# Patient Record
Sex: FEMALE
[De-identification: names, ages, dates, MRNs, and addresses within clinical notes are randomized per-mention and may not be internally consistent; named-entity substitution may affect disease eponyms.]

---

## 2023-11-24 ENCOUNTER — NON-APPOINTMENT (OUTPATIENT)
Age: 53
End: 2023-11-24

## 2024-01-28 ENCOUNTER — NON-APPOINTMENT (OUTPATIENT)
Age: 54
End: 2024-01-28

## 2024-01-30 PROBLEM — Z00.00 ENCOUNTER FOR PREVENTIVE HEALTH EXAMINATION: Status: ACTIVE | Noted: 2024-01-30

## 2024-01-31 ENCOUNTER — APPOINTMENT (OUTPATIENT)
Age: 54
End: 2024-01-31
Payer: COMMERCIAL

## 2024-01-31 VITALS
SYSTOLIC BLOOD PRESSURE: 133 MMHG | DIASTOLIC BLOOD PRESSURE: 83 MMHG | OXYGEN SATURATION: 96 % | WEIGHT: 182 LBS | HEART RATE: 70 BPM | BODY MASS INDEX: 31.07 KG/M2 | HEIGHT: 64 IN

## 2024-01-31 DIAGNOSIS — M25.561 PAIN IN RIGHT KNEE: ICD-10-CM

## 2024-01-31 DIAGNOSIS — S83.104A UNSPECIFIED DISLOCATION OF RIGHT KNEE, INITIAL ENCOUNTER: ICD-10-CM

## 2024-01-31 DIAGNOSIS — S83.411A SPRAIN OF MEDIAL COLLATERAL LIGAMENT OF RIGHT KNEE, INITIAL ENCOUNTER: ICD-10-CM

## 2024-01-31 PROCEDURE — 20610 DRAIN/INJ JOINT/BURSA W/O US: CPT | Mod: RT

## 2024-01-31 PROCEDURE — 99204 OFFICE O/P NEW MOD 45 MIN: CPT | Mod: 25

## 2024-01-31 NOTE — PHYSICAL EXAM
[de-identified] : Right lower extremity - Skin intact some bruising mild effusion - Knee range of motion 0-90 she does have pain with deep flexion - Knee stable varus valgus stress at 0 and 30 degrees - Negative anterior and posterior drawer negative Lachman - Patient does have tenderness over the medial joint line and some tenderness over the insertion of the MCL - Positive Otf's - Sensation intact light touch distally - Leg warm well-perfused  Left lower extremity - Skin intact some bruising - Knee stable varus valgus are 0 to 30 degrees - Negative anterior and posterior drawer negative Lachman - No tenderness throughout exam - Sensation tact light touch distally - Leg warm well-perfused [de-identified] : X-rays from [facility] urgent care from 1/29/2024 3 views of the right knee my independent interpretation: No fractures or dislocations mild arthritic changes with some medial joint space narrowing

## 2024-01-31 NOTE — ASSESSMENT
[FreeTextEntry1] : 53-year-old female with chronic bilateral knee pain with acute worsening of her right knee pain after a fall history and exam consistent with MCL sprain and possible degenerative meniscal tear medially right -Discussed with patient on x-ray she does have any fractures or dislocations.  She does have some mild arthritic changes.  On exam she does have medial joint line tenderness and tenderness over the medial collateral ligament.  Discussed with patient that she may benefit from a brace and physical therapy.  We did discuss potential steroid injection as patient was inquiring about that.  Discussed with patient that steroid injection may help with her pain if she does indeed have a meniscal tear.  We went over the risk benefits alternatives.  The risk of the steroid injection would be infection pain with the injection allergic reaction hypopigmentation of the injection site.  Also risk that the injection does not help.  Alternatives would be bracing and oral anti-inflammatories.  Patient elected proceed with injection.  I think injection is a reasonable option given she does have signs of a meniscal tear that is likely degenerative.  In addition to the injection I recommend physical therapy.  Physical therapy referral was placed.  Will have patient follow-up in 2 to 3 months for repeat assessment.

## 2024-01-31 NOTE — HISTORY OF PRESENT ILLNESS
[de-identified] : 53-year-old female who had a trip and fall onto her bilateral knees about 3 weeks ago.  She was doing okay until about a week ago she had increase in her pain.  She went to urgent care had x-rays obtained.  She was told she had no fractures and follow-up with me.  Patient complains mostly of right knee pain.  The pain is more on the inside medial aspect of her knee.  Pain is worse with walking and range of motion of her knee.  She describes she has had chronic pain in her bilateral knees but this is a new pain.  Denies any numbness or tingling

## 2024-01-31 NOTE — PROCEDURE
[Injection] : Injection [Right] : of the right [Knee Joint] : knee joint [Joint Pain] : Joint pain [Patient] : patient [Risk] : risk [Benefits] : benefits [Alternatives] : alternatives [Bleeding] : bleeding [Infection] : infection [Allergic Reaction] : allergic reaction [Verbal Consent Obtained] : verbal consent was obtained prior to the procedure [Ethyl Chloride Spray] : ethyl chloride spray was used as a topical anesthetic [Lateral] : lateral [Superior] : superior [1% Lidocaine___(mL)] : [unfilled] mL of 1% Lidocaine [Methylpred. 40mg/mL___(mL)] : [unfilled] mL of 40mg/mL methylprednisolone [Bandage Applied] : a bandage [Tolerated Well] : The patient tolerated the procedure well [None] : none [FreeTextEntry1] : ChloraPrep [FreeTextEntry9] : 21

## 2025-06-26 ENCOUNTER — NON-APPOINTMENT (OUTPATIENT)
Age: 55
End: 2025-06-26

## 2025-06-27 ENCOUNTER — APPOINTMENT (OUTPATIENT)
Facility: CLINIC | Age: 55
End: 2025-06-27

## 2025-06-27 VITALS
OXYGEN SATURATION: 96 % | DIASTOLIC BLOOD PRESSURE: 85 MMHG | HEART RATE: 67 BPM | SYSTOLIC BLOOD PRESSURE: 122 MMHG | BODY MASS INDEX: 30.73 KG/M2 | WEIGHT: 180 LBS | HEIGHT: 64 IN | RESPIRATION RATE: 17 BRPM

## 2025-06-27 PROBLEM — M25.832 MASS OF JOINT OF LEFT WRIST: Status: ACTIVE | Noted: 2025-06-27

## 2025-06-27 PROCEDURE — 73110 X-RAY EXAM OF WRIST: CPT

## 2025-06-27 PROCEDURE — 99203 OFFICE O/P NEW LOW 30 MIN: CPT

## 2025-09-05 ENCOUNTER — NON-APPOINTMENT (OUTPATIENT)
Age: 55
End: 2025-09-05